# Patient Record
Sex: MALE | Race: OTHER | HISPANIC OR LATINO
[De-identification: names, ages, dates, MRNs, and addresses within clinical notes are randomized per-mention and may not be internally consistent; named-entity substitution may affect disease eponyms.]

---

## 2021-01-07 ENCOUNTER — TRANSCRIPTION ENCOUNTER (OUTPATIENT)
Age: 31
End: 2021-01-07

## 2021-01-07 ENCOUNTER — EMERGENCY (EMERGENCY)
Facility: HOSPITAL | Age: 31
LOS: 1 days | Discharge: HOME | End: 2021-01-07
Payer: COMMERCIAL

## 2021-01-07 VITALS
RESPIRATION RATE: 20 BRPM | SYSTOLIC BLOOD PRESSURE: 122 MMHG | WEIGHT: 199.96 LBS | HEIGHT: 71 IN | OXYGEN SATURATION: 97 % | TEMPERATURE: 99 F | HEART RATE: 72 BPM | DIASTOLIC BLOOD PRESSURE: 79 MMHG

## 2021-01-07 PROCEDURE — U0005: CPT

## 2021-01-07 PROCEDURE — 99284 EMERGENCY DEPT VISIT MOD MDM: CPT

## 2021-01-07 PROCEDURE — 99283 EMERGENCY DEPT VISIT LOW MDM: CPT

## 2021-01-07 PROCEDURE — U0003: CPT

## 2021-01-07 NOTE — ED PROVIDER NOTE - PATIENT PORTAL LINK FT
You can access the FollowMyHealth Patient Portal offered by [facility] by registering at the following website: [email or web address]. By joining [facility]’s FollowMyHealth portal, you will also be able to view your health information using other applications (apps) compatible with our system.

## 2021-01-07 NOTE — ED PROVIDER NOTE - PHYSICAL EXAMINATION
Const: AOX3 nontoxic appearing, no apparent respiratory or physical distress. Stable gait   HEENT: NC/AT. Moist mucous membranes.  Eyes: PERRL. EOMI  Neck: Soft and supple. Full ROM without pain.  Cardiac: Regular rate and regular rhythm. +S1/S2. No murmurs. Peripheral pulses 2+ and symmetric. No LE edema.  Resp: Speaking in full sentences. No evidence of respiratory distress. No wheezes, rales or rhonchi. No adventitious breath sounds   Abd: Soft, non-tender, non-distended. Normal bowel sounds in all 4 quadrants. No guarding or rebound.  Back: Spine midline and non-tender. No CVAT.  Skin: No rashes, abrasions or lacerations.  Lymph: No cervical lymphadenopathy.  Neuro: Awake, alert & oriented x 3. Moves all extremities symmetrically.

## 2021-01-07 NOTE — ED PROVIDER NOTE - CLINICAL SUMMARY MEDICAL DECISION MAKING FREE TEXT BOX
29 y/o M Pt nontoxic appearing, stable vitals, ambulatory with stable saturation without supplemental oxygen. PT does not meet criteria listed in most updated guidelines as per [facility] protocol/algorithm for admission at this time. pt advised about self-quarantine instructions until negative test results and/or symptom resolution. pt advised on hand hygiene, monitoring of symptoms, antipyretic use as well as and fu with primary care provider. Instructions given in pre-printed copy.

## 2021-01-07 NOTE — ED PROVIDER NOTE - CARE PLAN
Principal Discharge DX:	Exposure to COVID-19 virus  Secondary Diagnosis:	Body aches  Secondary Diagnosis:	Headache

## 2021-01-07 NOTE — ED ADULT TRIAGE NOTE - CHIEF COMPLAINT QUOTE
Patient presents to ER requesting COVID test, asymptomatic, reports close contact with COVID+ patient

## 2021-01-08 LAB — SARS-COV-2 RNA SPEC QL NAA+PROBE: SIGNIFICANT CHANGE UP

## 2021-01-12 ENCOUNTER — EMERGENCY (EMERGENCY)
Facility: HOSPITAL | Age: 31
LOS: 1 days | Discharge: HOME | End: 2021-01-12
Payer: COMMERCIAL

## 2021-01-12 VITALS
DIASTOLIC BLOOD PRESSURE: 88 MMHG | RESPIRATION RATE: 17 BRPM | SYSTOLIC BLOOD PRESSURE: 137 MMHG | HEART RATE: 67 BPM | OXYGEN SATURATION: 98 % | HEIGHT: 71 IN | TEMPERATURE: 98 F

## 2021-01-12 PROCEDURE — U0005: CPT

## 2021-01-12 PROCEDURE — 99283 EMERGENCY DEPT VISIT LOW MDM: CPT

## 2021-01-12 PROCEDURE — U0003: CPT

## 2021-01-12 NOTE — ED PROVIDER NOTE - CLINICAL SUMMARY MEDICAL DECISION MAKING FREE TEXT BOX
Pt nontoxic appearing, stable vitals, ambulatory with stable saturation without supplemental oxygen. PT does not meet criteria listed in most updated guidelines as per [facility] protocol/algorithm for admission at this time. pt advised about self-quarantine instructions until negative test results and/or symptom resolution. pt advised on hand hygiene, monitoring of symptoms, antipyretic use as well as and fu with primary care provider. Instructions given in pre-printed copy.

## 2021-01-12 NOTE — ED PROVIDER NOTE - PATIENT PORTAL LINK FT
You can access the FollowMyHealth Patient Portal offered by [facility] by registering at the following website: [email or web address]. By joining [facility]’s FollowMyHealth portal, you will also be able to view your health information using other applications (apps) compatible with our system.

## 2021-01-12 NOTE — ED PROVIDER NOTE - NS ED ROS FT
Denies fever, chills, fatigue, and weight loss. Denies HA, Dizziness.   ENMT: Denies URI symptoms, difficulty swallowing, sore throat, loss of taste or smell.   CARDIO: Denies CP, palpitations, edema.   RESP: Denies Cough, SOB, Diff breathing,   GI: Denies N/V, ABD pain,

## 2021-01-12 NOTE — ED ADULT TRIAGE NOTE - CHIEF COMPLAINT QUOTE
Requesting COVID swab. Positive known exposure 1 week ago at work. Denies any fevers, SOB, cough or difficulty breathing.

## 2021-01-12 NOTE — ED PROVIDER NOTE - OBJECTIVE STATEMENT
Pt presenting to the ER for COVID-19 testing. Denies fevers chills, loss of taste or smell, URI symptoms, chest pain or shortness of breath, nausea vomiting diarrhea abdominal pain, weakness or fatigue. Eating and drinking normal diet. Normal output. Pt requesting testing at this time. [x] known exposure [] no-known exposure [] travel [x] no travel [ ] smoker [ ] non-smoker

## 2021-01-13 LAB — SARS-COV-2 RNA SPEC QL NAA+PROBE: SIGNIFICANT CHANGE UP

## 2021-02-06 ENCOUNTER — EMERGENCY (EMERGENCY)
Facility: HOSPITAL | Age: 31
LOS: 1 days | Discharge: HOME | End: 2021-02-06
Payer: COMMERCIAL

## 2021-02-06 VITALS
TEMPERATURE: 98 F | HEART RATE: 70 BPM | OXYGEN SATURATION: 97 % | WEIGHT: 199.96 LBS | HEIGHT: 71 IN | RESPIRATION RATE: 18 BRPM | DIASTOLIC BLOOD PRESSURE: 78 MMHG | SYSTOLIC BLOOD PRESSURE: 121 MMHG

## 2021-02-06 LAB — SARS-COV-2 RNA SPEC QL NAA+PROBE: SIGNIFICANT CHANGE UP

## 2021-02-06 PROCEDURE — U0003: CPT

## 2021-02-06 PROCEDURE — U0005: CPT

## 2021-02-06 PROCEDURE — 99282 EMERGENCY DEPT VISIT SF MDM: CPT

## 2021-02-06 PROCEDURE — 99283 EMERGENCY DEPT VISIT LOW MDM: CPT

## 2021-02-06 NOTE — ED PROVIDER NOTE - NS ED ROS FT
Gen: denies fever, chills, fatigue, weight loss  Skin: denies rashes, laceration, bruising  HEENT: denies visual changes, ear pain, nasal congestion, throat pain  Respiratory: denies KAN, SOB, cough, wheezing  Cardiovascular: denies chest pain, palpitations, diaphoresis, LE edema  GI: denies abdominal pain, n/v/d

## 2021-02-06 NOTE — ED PROVIDER NOTE - PATIENT PORTAL LINK FT
You can access the FollowMyHealth Patient Portal offered by [facility] by registering at the following website: [email or web address]. By joining [facility]’s FollowMyHealth portal, you will also be able to view your health information using other applications (apps) compatible with our system.

## 2021-02-06 NOTE — ED PROVIDER NOTE - OBJECTIVE STATEMENT
32yo M presenting for covid testing. Pt had positive exposure last week, job is requesting pt get tested. Pt feeling well, no symptoms. Denies fever, chills, cough, sob, cp, body aches, loss of smell/taste.

## 2024-06-13 NOTE — ED PROVIDER NOTE - OBJECTIVE STATEMENT
Quality 226: Preventive Care And Screening: Tobacco Use: Screening And Cessation Intervention: Patient screened for tobacco use and is an ex/non-smoker Detail Level: Detailed 31 y/o M PT with no SPMHx presents to the ED with complaint of + exposure to covid-19 at work. Pt is a . Pt states he has HA and body aches. Denies cough, chest or abdominal pain, N,V,D, loss of smell or taste. Patient denies EtOH/tobacco/illicit substance use.

## 2024-10-22 ENCOUNTER — EMERGENCY (EMERGENCY)
Facility: HOSPITAL | Age: 34
LOS: 1 days | Discharge: HOME | End: 2024-10-22
Attending: EMERGENCY MEDICINE | Admitting: EMERGENCY MEDICINE

## 2024-10-22 VITALS
DIASTOLIC BLOOD PRESSURE: 82 MMHG | RESPIRATION RATE: 18 BRPM | SYSTOLIC BLOOD PRESSURE: 124 MMHG | HEART RATE: 69 BPM | TEMPERATURE: 98 F | OXYGEN SATURATION: 97 % | WEIGHT: 250 LBS

## 2024-10-22 PROCEDURE — 99283 EMERGENCY DEPT VISIT LOW MDM: CPT

## 2024-10-22 NOTE — ED PROVIDER NOTE - ATTENDING CONTRIBUTION TO CARE
Pt is a 34 yr old male who was seen and examined with the resident.  Pt fractured his fourth digit at the PIP last week, an injury for which he was evaluated and treated at [facility] by an Orthopedic physician. He was placed in a right arm cast that extended to the elbow. Recently, he has noticed a feeling of instability in the cast (seems loose). Despite attempts at rewrapping it himself, he believes the cast does not provide adequate support. He has not followed up with Orthopedics due to an insurance issue and he's processing this through the [facility].     On our exam, he has full sensation in all digits with no discoloration or bruising.    The ACE bandage portion is too loose.  We will re-wrap that and have our Orthopedic Office call the patient to arrange for a follow up appointment.

## 2024-10-22 NOTE — ED PROVIDER NOTE - CARE PLAN
Patient : Donald Arguello Age: 32 year old Sex: male   MRN: [id number] Encounter Date: 6/28/2018  B08/B08    History     Chief Complaint   Patient presents with   • Hand Pain   • Fall     HPI  History provided by patient.     6:31 PM Donald Arguello is a 32 year old male with a h/o DM who presents to ED c/o a laceration to his L hand which he obtained PTA. The pt was ambulating down the stairs outside, when he tripped, missed a step, fell forwards and attempted to brace his fall with his bilateral hands when he obtained the laceration. He did not hit his head or suffer any LOC during this fall. He does also report having abrasions to his L knee, but denies any L hand/wrist pain or L knee pain. He was ambulatory after the fall occurred. The pt was asymptomatic prior to his fall. Per EMS, the pt's blood sugar is 429 which the pt informed them is baseline for him. He checked his blood sugar this morning, at which time it was \"100 something\". He states \"my blood sugar is all over the place all of the time. Sometimes the machine said it can't read my blood sugar\". He states he didn't take his insulin yesterday. The pt denies any nausea, vomiting, abd pain, dizziness, HA, lightheadedness or dysphoria.     The pt takes anti-depressant medication, Depakote, Fluoxetine, Seroquel, Humalog and Lantus. The pt did not use his prescribed insulin yesterday. He believes that his last tetanus booster was 6 years ago. No other complaints or modifying factors reported.      PCP: Non- Pcp    No Known Allergies    Prior to Admission Medications    ACETAMINOPHEN (TYLENOL) 325 MG TABLET    Take 2 tablets by mouth every 4 hours as needed for Pain.    CLONIDINE (CATAPRES) 0.1 MG TABLET    Take 0.1 mg by mouth 2 times daily.      CYCLOBENZAPRINE (FLEXERIL) 10 MG TABLET    Take 1 tablet by mouth 3 times daily as needed for Muscle spasms.    GLUCOSE BLOOD STRIP    1 each as needed.    HYDROCODONE-ACETAMINOPHEN (NORCO) 5-325 MG PER TABLET     Take 1-2 tablets by mouth every 4 hours as needed for Pain.    NAPROXEN (NAPROSYN) 500 MG TABLET    Take 1 tablet by mouth 2 times daily (with meals).    NAPROXEN SODIUM PO    Take  by mouth.      SULFAMETHOXAZOLE-TRIMETHOPRIM (BACTRIM DS) 800-160 MG PER TABLET    Take 1 tablet by mouth 2 times daily.       Past Medical History:   Diagnosis Date   • Right wrist pain        Family History reviewed. No pertinent family history.     Surgical History reviewed. No pertinent surgical history.    Social History   Substance Use Topics   • Smoking status: Current Every Day Smoker     Packs/day: 1.00     Types: Cigarettes   • Smokeless tobacco: Never Used   • Alcohol use No       Review of Systems   Constitutional: Negative for chills and fever.   HENT: Negative for congestion and sore throat.    Respiratory: Negative for cough, chest tightness, shortness of breath and wheezing.    Cardiovascular: Negative for chest pain and leg swelling.   Gastrointestinal: Negative for abdominal pain, blood in stool, diarrhea and nausea.   Genitourinary: Negative for dysuria, flank pain, frequency and hematuria.   Musculoskeletal: Negative for joint swelling and neck stiffness.   Skin: Positive for wound (L hand laceration). Negative for rash.   Neurological: Negative for weakness, numbness and headaches.   Hematological: Does not bruise/bleed easily.       Physical Exam     ED Triage Vitals [06/28/18 1828]   ED Triage Vitals Group      Temp 97.6 °F (36.4 °C)      Pulse 94      Resp 18      /64      SpO2 97 %      EtCO2 mmHg       Height 5' 3\" (1.6 m)      Weight 124 lb 9 oz (56.5 kg)      Weight Scale Used ED Actual       Physical Exam   Constitutional: He is oriented to person, place, and time. He appears well-developed and well-nourished. No distress.   HENT:   Head: Normocephalic and atraumatic.   Right Ear: External ear normal.   Left Ear: External ear normal.   Mouth/Throat: Oropharynx is clear and moist. No oropharyngeal  1 exudate.   Eyes: Conjunctivae and EOM are normal. Pupils are equal, round, and reactive to light. No scleral icterus.   Pupils are 3 mm and reactive   Neck: Normal range of motion. Neck supple. No JVD present.   Cardiovascular: Normal rate, regular rhythm, normal heart sounds and intact distal pulses.    No murmur heard.  Pulses:       Radial pulses are 2+ on the left side.        Dorsalis pedis pulses are 2+ on the left side.        Posterior tibial pulses are 2+ on the left side.   Pulmonary/Chest: Effort normal and breath sounds normal. No respiratory distress. He has no wheezes. He has no rales. He exhibits no tenderness.   Abdominal: Soft. Bowel sounds are normal. He exhibits no distension and no mass. There is no tenderness. There is no rebound and no guarding.   Musculoskeletal: Normal range of motion. He exhibits tenderness. He exhibits no edema or deformity.   C, T and L spine nontender  L patellar tenderness   Lymphadenopathy:     He has no cervical adenopathy.   Neurological: He is alert and oriented to person, place, and time. No cranial nerve deficit. Coordination normal.   Motor strength normal in all 4 extremities and sensation normal in all 4 extremities   Skin: Skin is warm and dry. No rash noted. He is not diaphoretic. No erythema.   L hand: over the palmar long finger on the MTP joint, there is a 1 cm skin avulsion, there is pain with making a fist, no deformity, straight fingers, full extension, FDP is intact, FDS is intact    Abrasion to the L knee   Psychiatric: He has a normal mood and affect. His behavior is normal. Judgment and thought content normal.   Nursing note and vitals reviewed.      ED Course     Laceration Repair  Date/Time: 6/28/2018 9:37 PM  Performed by: MANISHA HOLLIS  Authorized by: MANISHA HOLLIS     Consent:     Consent obtained:  Verbal    Consent given by:  Patient    Risks discussed:  Pain, need for additional repair and infection    Alternatives discussed:  No  treatment  Anesthesia (see MAR for exact dosages):     Anesthesia method:  Topical application    Topical anesthetic:  LET  Laceration details:     Location:  Hand    Length (cm):  1    Laceration depth: skin flap.  Pre-procedure details:     Preparation:  Patient was prepped and draped in usual sterile fashion  Exploration:     Wound exploration: wound explored through full range of motion and entire depth of wound probed and visualized      Wound extent comment:  Devitalised skin flap. No suturable laceration.     Contaminated: no    Treatment:     Area cleansed with:  Saline    Amount of cleaning:  Standard    Irrigation solution:  Sterile saline    Irrigation method:  Syringe    Visualized foreign bodies/material removed: no    Skin repair:     Repair method: Devitalized skin flap had curled into the wound. It was trimmed back. Triple antibitotic ointment applied, vaseline gerda and a padded dressing.  Post-procedure details:     Dressing:  Antibiotic ointment, non-adherent dressing and bulky dressing    Patient tolerance of procedure:  Tolerated well, no immediate complications        Lab Results     Results for orders placed or performed during the hospital encounter of 06/28/18   Basic Metabolic Panel   Result Value Ref Range    Sodium 140 135 - 145 mmol/L    Potassium 4.4 3.4 - 5.1 mmol/L    Chloride 105 98 - 107 mmol/L    Carbon Dioxide 28 21 - 32 mmol/L    Anion Gap 11 10 - 20 mmol/L    Glucose 397 (H) 65 - 99 mg/dL    BUN 18 6 - 20 mg/dL    Creatinine 0.96 0.67 - 1.17 mg/dL    GFR Estimate,  >90     GFR Estimate, Non African American >90     BUN/Creatinine Ratio 19 7 - 25    CALCIUM 8.5 8.4 - 10.2 mg/dL   Magnesium Level   Result Value Ref Range    MAGNESIUM 2.1 1.7 - 2.4 mg/dL   Urinalysis & Reflex Micro with Culture if Indicated   Result Value Ref Range    COLOR YELLOW YELLOW    APPEARANCE CLEAR     GLUCOSE(URINE) >1000 (A) NEGATIVE mg/dL    BILIRUBIN NEGATIVE NEGATIVE    KETONES  NEGATIVE NEGATIVE mg/dL    SPECIFIC GRAVITY 1.030 1.005 - 1.030    BLOOD NEGATIVE NEGATIVE    pH 7.5 (H) 5.0 - 7.0 Units    PROTEIN(URINE) NEGATIVE NEGATIVE mg/dL    UROBILINOGEN 0.2 0.0 - 1.0 mg/dL    NITRITE NEGATIVE NEGATIVE    LEUKOCYTE ESTERASE SMALL (A) NEGATIVE    SPECIMEN TYPE URINE, CLEAN CATCH/MIDSTREAM     Squamous EPI'S 1 to 5 0 - 5 /hpf    RBC 1 to 3 0 - 3 /hpf    WBC 11 to 25 0 - 5 /hpf    BACTERIA FEW (A) NONE SEEN /hpf    Hyaline Casts NONE SEEN 0 - 5 /lpf   Chem 8 Panel - Point of Care   Result Value Ref Range    Sodium  135 - 145 mmol/L    Potassium POC 4.0 3.4 - 5.1 mmol/L    Chloride  98 - 107 mmol/L    CALCIUM IONIZED-POC 1.11 (L) 1.15 - 1.29 mmol/L    CO2 Total 27 (H) 19 - 24 mmol/L    GLUCOSE  (H) 65 - 99 mg/dL    BUN POC 18 6 - 20 mg/dL    HEMATOCRIT POC 45.0 39.0 - 51.0 %    Hemoglobin POC 15.3 13.0 - 17.0 g/dL    ANION GAP POC 16 mmol/L    Creatinine POC 1.00 0.67 - 1.17 mg/dL    Estimated GFR  (POC) >90     Estimated GFR Non- (POC) >90    Metered blood glucose   Result Value Ref Range    Glucose Bedside  (H) 65 - 99 mg/dL   Metered blood glucose   Result Value Ref Range    Glucose Bedside  (H) 65 - 99 mg/dL       Radiology Results     Imaging Results          XR Knee 4+ View Left (Final result)  Result time 06/28/18 20:04:42    Final result                 Impression:    IMPRESSION:  Negative left hand and left knee.               Narrative:    XR HAND 3+ VIEW LEFT, XR KNEE 4+ VW LEFT         DATE:6/28/2018    INDICATION:  trauma and laceration to middle finger MCP area.    COMPARISON:  None.    FINDINGS:  3 projections left hand demonstrate no fracture or subluxation. Normal  articulations. Negative soft tissues.  4. Projections of the left knee demonstrate no joint effusion or fracture.  Normal joint space and smooth articular margins. Negative soft tissues.                               XR Hand 3+ View Left (Final  result)  Result time 06/28/18 20:04:42    Final result                 Impression:    IMPRESSION:  Negative left hand and left knee.               Narrative:    XR HAND 3+ VIEW LEFT, XR KNEE 4+ VW LEFT         DATE:6/28/2018    INDICATION:  trauma and laceration to middle finger MCP area.    COMPARISON:  None.    FINDINGS:  3 projections left hand demonstrate no fracture or subluxation. Normal  articulations. Negative soft tissues.  4. Projections of the left knee demonstrate no joint effusion or fracture.  Normal joint space and smooth articular margins. Negative soft tissues.                                ED Medication Orders     Start Ordered     Status Ordering Provider    06/28/18 2142 06/28/18 2141  cephALEXin (KEFLEX) capsule 500 mg  ONCE      Last MAR action:  Given MANISHA HOLLIS    06/28/18 2125 06/28/18 2125       Comments:  Noni Cuellar: cabinet override    Discontinued     06/28/18 2041 06/28/18 2040    ONCE      Discontinued MANISHA HOLILS    06/28/18 1900 06/28/18 1859  lidocaine 4 %-EPINEPHRine 0.05 % (L.E.) (compounded) topical gel 2 mL  ONCE      Last MAR action:  Given MANISHA HOLLIS    06/28/18 1858 06/28/18 1857  sodium chloride (NORMAL SALINE) 0.9 % bolus 1,000 mL  ONCE      Last MAR action:  Completed MANISHA HOLLIS          [facility]    Vitals:    06/28/18 2030 06/28/18 2059 06/28/18 2100 06/28/18 2157   BP: 113/58  125/58 124/80   Pulse: 83  86 78   Resp: 18 18 18   Temp:       TempSrc:       SpO2: 97% 97% 98% 98%   Weight:       Height:           ED Course  6:43 PM Initial Impression:  I performed the initial assessment and evaluation of the patient. The pt presents to the ED with complaints of a laceration to his L hand which he obtained PTA. The pt was also noted to be hyperglycemic by EMS with a blood sugar of 429, although the pt states this is not unusual for him. I informed the pt of the plan for wound prep, blood work, XR of the L hand and of his L knee. The patient agrees with the  plan and will be re-assessed shortly.     6:59 PM Per WIR, the pt's last tetanus booster was in 2015.    8:39 PM I rechecked on the patient. I updated the pt on his unremarkable XR results. Blood glucose down to less than 300now with IVF.  No sign of any DKA. Wound prep done and I did wound care as above with trimming back skin flap, and dressing. There was NO suturable laceration. Discussed plan for DC with patient. Daily dressing changes. 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 He denies urinary symptoms but we discussed his slightly abnormal UA. Plan for Keflex for 7 days (prophylaxis for hand given DM and urinary coverage with low suspicion for acute UTI but given DM it is possible). Recommend PMD follow up next week to recheck wound and BG and UCx. Immediate return to the ED for any wound concerns of drainage or redness or pus. He agrees.    ED MDM  Critical Care time spent on this patient outside of billable procedures:  None    Mechanical fall without head injury or neck or back injury, no fx on xrays. Wound care as above. Hyperglycemia without DKA, likely from poor medication compliance. UA slightly abnormal without symptoms, Cx pending.    Clinical Impression  ED Diagnoses        Final diagnoses    Laceration of left hand without foreign body, initial encounter     Abrasion of left knee, initial encounter     Hyperglycemia     Fall, initial encounter           The patient was provided with a recommendation to follow up with a primary care provider and obtain reassessment of his/her blood pressure within three months.    Follow Up:  Your doctor    Schedule an appointment as soon as possible for a visit in 3 days  See your doctor in 3 days for a wound and blood sugar recheck and to recheck the final urine result          Keep wound clean and dry. Wash once a day. Return immediately to the ED for fever, red streaking, pus drainage or worse in any way       Discharge Medication List  as of 6/28/2018  9:42 PM          Pt is discharged in stable condition  I have reviewed the information recorded by the scribe for accuracy and agree with its contents.    Juancarlos Carpenter acting as a scribe for Dr. Mitch Taylor ____________________________________________________________________  Scribe: Juancarlos Carpenter  Physician: Mitch Sheriff MD  Physician #:  [id number]  6/28/2018                    Mitch Sheriff MD  06/29/18 0048     Principal Discharge DX:	Encounter for cast care

## 2024-10-22 NOTE — ED PROVIDER NOTE - CLINICAL SUMMARY MEDICAL DECISION MAKING FREE TEXT BOX
Patient presents emergency department for concern of right wrist splint.  Patient is hemodynamically stable afebrile presentation.  Patient physical exam unremarkable at this time.  Patient normal cap refill and neurovascularly intact.  Ace bandage appears warm.  Will replace Ace bandage.  At this time, the splint will not be changed.  Appears stable on exam.  Will follow-up with orthopedics

## 2024-10-22 NOTE — ED PROVIDER NOTE - NSPTACCESSSVCSAPPTDETAILS_ED_ALL_ED_FT
Has fracture of 4th finger/PIP. Cast placed by [facility] ED in [place].  He now needs an ortho doctor that takes his insurance (cannot follow up at [facility]).  Pt works for [employer]

## 2024-10-22 NOTE — ED PROVIDER NOTE - PHYSICAL EXAMINATION
Physical Exam:  Gen: NAD, AOx3, non-toxic appearing, able to ambulate without assistance  Head: NCAT  HEENT: EOMI, PEERLA, normal conjunctiva, tongue midline, oral mucosa moist  Lung: CTAB, no respiratory distress, no wheezes/rhonchi/rales B/L, speaking in full sentences  CV: RRR, no murmurs, rubs or gallops  Abd: soft, NT, ND, no guarding, no rigidity, no rebound tenderness, no CVA tenderness   MSK: Right upper extremity cast ranging from distal fingers to right before the elbow.  Appears intact on exam.  Neuro: No focal sensory or motor deficits  Skin: Warm, well perfused, no rash, no leg swelling

## 2024-10-22 NOTE — ED ADULT TRIAGE NOTE - CHIEF COMPLAINT QUOTE
request reinforcement of rt hand arm cast. pt states he was told to come to the ER if the cast becomes loose.

## 2024-10-22 NOTE — ED PROVIDER NOTE - PATIENT PORTAL LINK FT
You can access the FollowMyHealth Patient Portal offered by [facility] by registering at the following website: [email or web address]. By joining [facility]’s FollowMyHealth portal, you will also be able to view your health information using other applications (apps) compatible with our system.

## 2024-10-22 NOTE — ED PROVIDER NOTE - NSFOLLOWUPINSTRUCTIONS_ED_ALL_ED_FT
Thank you for letting us take care of you today.  Return to the Emergency Department if your symptoms worsen, or if any problems.    Our [facility] will contact you to make a follow up appointment.    Follow the Cast Care instructions below:    Cast Care    WHAT YOU NEED TO KNOW:    What do I need to know about cast care? Cast care will help the cast dry and harden correctly, and then protect it until it comes off. Your cast may need up to 48 hours to dry and harden completely. Even after your cast hardens, it can be damaged.    How do I care for my cast while it hardens?    Protect the cast. Do not put weight on the cast. Do not bend, lean on, or hit the cast with anything. Use the palms of your hands when you move the cast. Do not use your fingers. Your fingers may leave marks on the cast as it dries.    Change positions often. Change your position every 2 hours to help the cast dry faster. Prop your cast on something soft, such as a pillow, to prevent a flat area on your cast.    Keep the cast dry. Tie plastic trash bags around your cast to keep it dry while you bathe. You may use a blow dryer on cool or the lowest heat setting to dry your cast if it gets wet. Do not use a high heat setting, because you may burn your skin. Certain casts can get wet. Ask if you have a waterproof cast.  How do I care for my cast after it hardens?    Check your cast every day. Contact your healthcare provider if you notice any cracks, dents, holes, or flaking on your cast.    Keep your cast clean and dry. Cover your cast with a towel when you eat. You may have a small piece of cast that can be removed to check on incisions under your cast. Make sure the small piece of cast is kept tightly closed. If your cast gets dirty, use a mild detergent and a damp washcloth to wipe off the outside of your cast. Continue to cover your cast with trash bags to keep it dry while you bathe.    Care for the edges of your cast. Cover the cast edges to keep them smooth. Use 4 inch pieces of waterproof tape. Place one end of the tape under the inside edge of your cast and fold it over to the outside surface. Overlap tape strips until the edges are completely covered. Change the tape as directed. Do not pull or repair any of the padding from inside the cast. This could cause blisters and sores on the skin under your cast.    Keep weight off your cast. Do not let anyone push down or lean on your cast. This may cause it to break.    Do not use sharp objects. Do not use a sharp or pointed object to scratch under your cast. This may cause wounds that can get infected, or you may lose the item inside the cast. If your skin itches, blow cool air under the cast. You may also gently scratch your skin outside the cast with a cloth.  When should I seek immediate care?    Your cast breaks or gets damaged.    You see drainage, or your cast is stained or smells bad.    Your skin turns blue or pale.    Your skin tingles, burns, or is cold or numb.    You have severe pain that is getting worse and does not go away after you take pain medicine.    Your limb swells, or your cast looks or feels tighter than it was before.  When should I contact my healthcare provider?    Something falls into your cast and gets stuck.    You have itching, pain, burning, or weakness where you have the cast.    You have a fever.    You have sores, blisters, or breaks on the skin around the edges of the cast.    You have questions or concerns about your condition or care.  CARE AGREEMENT:    You have the right to help plan your care. Learn about your health condition and how it may be treated. Discuss treatment options with your healthcare providers to decide what care you want to receive. You always have the right to refuse treatment.

## 2024-10-24 DIAGNOSIS — Z47.89 ENCOUNTER FOR OTHER ORTHOPEDIC AFTERCARE: ICD-10-CM

## 2024-10-24 DIAGNOSIS — Z46.89 ENCOUNTER FOR FITTING AND ADJUSTMENT OF OTHER SPECIFIED DEVICES: ICD-10-CM

## 2025-07-16 NOTE — ED ADULT NURSE NOTE - IN ACCORDANCE WITH [place] LAW, WE OFFER EVERY PATIENT A HEPATITIS C TEST. WOULD YOU LIKE TO BE TESTED TODAY?
Opt out What Type Of Note Output Would You Prefer (Optional)?: Bullet Format What Is The Reason For Today's Visit?: Full Body Skin Examination with No Concerns What Is The Reason For Today's Visit? (Being Monitored For X): concerning skin lesions on a periodic basis